# Patient Record
Sex: FEMALE | Race: BLACK OR AFRICAN AMERICAN | NOT HISPANIC OR LATINO | URBAN - METROPOLITAN AREA
[De-identification: names, ages, dates, MRNs, and addresses within clinical notes are randomized per-mention and may not be internally consistent; named-entity substitution may affect disease eponyms.]

---

## 2019-10-30 ENCOUNTER — EMERGENCY (EMERGENCY)
Facility: HOSPITAL | Age: 24
LOS: 1 days | Discharge: HOME | End: 2019-10-30
Attending: EMERGENCY MEDICINE | Admitting: EMERGENCY MEDICINE
Payer: COMMERCIAL

## 2019-10-30 VITALS
TEMPERATURE: 98 F | DIASTOLIC BLOOD PRESSURE: 67 MMHG | SYSTOLIC BLOOD PRESSURE: 102 MMHG | HEART RATE: 68 BPM | OXYGEN SATURATION: 99 % | RESPIRATION RATE: 18 BRPM

## 2019-10-30 VITALS
TEMPERATURE: 98 F | DIASTOLIC BLOOD PRESSURE: 64 MMHG | RESPIRATION RATE: 17 BRPM | HEART RATE: 95 BPM | OXYGEN SATURATION: 99 % | SYSTOLIC BLOOD PRESSURE: 103 MMHG

## 2019-10-30 LAB
ALBUMIN SERPL ELPH-MCNC: 4.5 G/DL — SIGNIFICANT CHANGE UP (ref 3.3–5)
ALP SERPL-CCNC: 59 U/L — SIGNIFICANT CHANGE UP (ref 40–120)
ALT FLD-CCNC: 9 U/L — LOW (ref 10–45)
ANION GAP SERPL CALC-SCNC: 10 MMOL/L — SIGNIFICANT CHANGE UP (ref 5–17)
AST SERPL-CCNC: 24 U/L — SIGNIFICANT CHANGE UP (ref 10–40)
BASOPHILS # BLD AUTO: 0.02 K/UL — SIGNIFICANT CHANGE UP (ref 0–0.2)
BASOPHILS NFR BLD AUTO: 0.1 % — SIGNIFICANT CHANGE UP (ref 0–2)
BILIRUB SERPL-MCNC: 0.8 MG/DL — SIGNIFICANT CHANGE UP (ref 0.2–1.2)
BUN SERPL-MCNC: 10 MG/DL — SIGNIFICANT CHANGE UP (ref 7–23)
CALCIUM SERPL-MCNC: 9.1 MG/DL — SIGNIFICANT CHANGE UP (ref 8.4–10.5)
CHLORIDE SERPL-SCNC: 103 MMOL/L — SIGNIFICANT CHANGE UP (ref 96–108)
CO2 SERPL-SCNC: 24 MMOL/L — SIGNIFICANT CHANGE UP (ref 22–31)
CREAT SERPL-MCNC: 0.82 MG/DL — SIGNIFICANT CHANGE UP (ref 0.5–1.3)
EOSINOPHIL # BLD AUTO: 0.01 K/UL — SIGNIFICANT CHANGE UP (ref 0–0.5)
EOSINOPHIL NFR BLD AUTO: 0.1 % — SIGNIFICANT CHANGE UP (ref 0–6)
GLUCOSE SERPL-MCNC: 95 MG/DL — SIGNIFICANT CHANGE UP (ref 70–99)
HCG SERPL-ACNC: <0 MIU/ML — SIGNIFICANT CHANGE UP
HCT VFR BLD CALC: 41.5 % — SIGNIFICANT CHANGE UP (ref 34.5–45)
HGB BLD-MCNC: 12 G/DL — SIGNIFICANT CHANGE UP (ref 11.5–15.5)
IMM GRANULOCYTES NFR BLD AUTO: 0.7 % — SIGNIFICANT CHANGE UP (ref 0–1.5)
LIDOCAIN IGE QN: 24 U/L — SIGNIFICANT CHANGE UP (ref 7–60)
LYMPHOCYTES # BLD AUTO: 0.99 K/UL — LOW (ref 1–3.3)
LYMPHOCYTES # BLD AUTO: 6.5 % — LOW (ref 13–44)
MCHC RBC-ENTMCNC: 28 PG — SIGNIFICANT CHANGE UP (ref 27–34)
MCHC RBC-ENTMCNC: 28.9 GM/DL — LOW (ref 32–36)
MCV RBC AUTO: 97 FL — SIGNIFICANT CHANGE UP (ref 80–100)
MONOCYTES # BLD AUTO: 0.88 K/UL — SIGNIFICANT CHANGE UP (ref 0–0.9)
MONOCYTES NFR BLD AUTO: 5.7 % — SIGNIFICANT CHANGE UP (ref 2–14)
NEUTROPHILS # BLD AUTO: 13.33 K/UL — HIGH (ref 1.8–7.4)
NEUTROPHILS NFR BLD AUTO: 86.9 % — HIGH (ref 43–77)
NRBC # BLD: 0 /100 WBCS — SIGNIFICANT CHANGE UP (ref 0–0)
PLATELET # BLD AUTO: 174 K/UL — SIGNIFICANT CHANGE UP (ref 150–400)
POTASSIUM SERPL-MCNC: 3.5 MMOL/L — SIGNIFICANT CHANGE UP (ref 3.5–5.3)
POTASSIUM SERPL-SCNC: 3.5 MMOL/L — SIGNIFICANT CHANGE UP (ref 3.5–5.3)
PROT SERPL-MCNC: 7.9 G/DL — SIGNIFICANT CHANGE UP (ref 6–8.3)
RBC # BLD: 4.28 M/UL — SIGNIFICANT CHANGE UP (ref 3.8–5.2)
RBC # FLD: 13.4 % — SIGNIFICANT CHANGE UP (ref 10.3–14.5)
SODIUM SERPL-SCNC: 137 MMOL/L — SIGNIFICANT CHANGE UP (ref 135–145)
WBC # BLD: 15.33 K/UL — HIGH (ref 3.8–10.5)
WBC # FLD AUTO: 15.33 K/UL — HIGH (ref 3.8–10.5)

## 2019-10-30 PROCEDURE — 96375 TX/PRO/DX INJ NEW DRUG ADDON: CPT

## 2019-10-30 PROCEDURE — 36415 COLL VENOUS BLD VENIPUNCTURE: CPT

## 2019-10-30 PROCEDURE — 85025 COMPLETE CBC W/AUTO DIFF WBC: CPT

## 2019-10-30 PROCEDURE — 80053 COMPREHEN METABOLIC PANEL: CPT

## 2019-10-30 PROCEDURE — 84702 CHORIONIC GONADOTROPIN TEST: CPT

## 2019-10-30 PROCEDURE — 83690 ASSAY OF LIPASE: CPT

## 2019-10-30 PROCEDURE — 99284 EMERGENCY DEPT VISIT MOD MDM: CPT

## 2019-10-30 PROCEDURE — 99284 EMERGENCY DEPT VISIT MOD MDM: CPT | Mod: 25

## 2019-10-30 PROCEDURE — 96374 THER/PROPH/DIAG INJ IV PUSH: CPT

## 2019-10-30 RX ORDER — ACETAMINOPHEN 500 MG
650 TABLET ORAL ONCE
Refills: 0 | Status: COMPLETED | OUTPATIENT
Start: 2019-10-30 | End: 2019-10-30

## 2019-10-30 RX ORDER — ONDANSETRON 8 MG/1
4 TABLET, FILM COATED ORAL ONCE
Refills: 0 | Status: COMPLETED | OUTPATIENT
Start: 2019-10-30 | End: 2019-10-30

## 2019-10-30 RX ORDER — FAMOTIDINE 10 MG/ML
20 INJECTION INTRAVENOUS ONCE
Refills: 0 | Status: COMPLETED | OUTPATIENT
Start: 2019-10-30 | End: 2019-10-30

## 2019-10-30 RX ORDER — SODIUM CHLORIDE 9 MG/ML
1000 INJECTION INTRAMUSCULAR; INTRAVENOUS; SUBCUTANEOUS ONCE
Refills: 0 | Status: COMPLETED | OUTPATIENT
Start: 2019-10-30 | End: 2019-10-30

## 2019-10-30 RX ADMIN — Medication 650 MILLIGRAM(S): at 19:47

## 2019-10-30 RX ADMIN — FAMOTIDINE 20 MILLIGRAM(S): 10 INJECTION INTRAVENOUS at 17:19

## 2019-10-30 RX ADMIN — SODIUM CHLORIDE 1000 MILLILITER(S): 9 INJECTION INTRAMUSCULAR; INTRAVENOUS; SUBCUTANEOUS at 17:18

## 2019-10-30 RX ADMIN — ONDANSETRON 4 MILLIGRAM(S): 8 TABLET, FILM COATED ORAL at 17:19

## 2019-10-30 NOTE — ED PROVIDER NOTE - CLINICAL SUMMARY MEDICAL DECISION MAKING FREE TEXT BOX
Pt p/w n/v/d, abd pain, s/p eating pork. Pt a/w b/l ext tingling and carpal spasm, likely 2/2 hyperventilation. IVF, supportive care, check labs, re-eval. Consider CT. Dispo pending w/u and clinical status. DDx includes viral illness / AGE, food poisoning, less likely other intra-abdominal pathology.

## 2019-10-30 NOTE — ED PROVIDER NOTE - CARE PLAN
Principal Discharge DX:	Abdominal pain, unspecified abdominal location  Secondary Diagnosis:	Vomiting and diarrhea

## 2019-10-30 NOTE — ED PROVIDER NOTE - OBJECTIVE STATEMENT
Pt w/ PMHx asthma, no PSHx p/w abd pain, n/v. Pt ate lunch at a restaurant, pork w/ vegetables. 10 min later pt began feeling periumbilical, sharp, constant, abd pain, w/ n/v x 4, yellow in color. + single BM, watery to soft, yellow in color. No blood noted. No fever. + chills. Pt reports feeling tingling in her legs and arms b/l. LMP 10/20. No vag bleeding or discharge. Pt does not suspect pregnancy

## 2019-10-30 NOTE — ED PROVIDER NOTE - PROGRESS NOTE DETAILS
Pt feeling much better. No pain. No vomiting in the ED. Sx resolved w/ IVF, zofran / pepcid. Repeat abd exam soft, NT, ND. Pt c/o HA only at this time, Tylenol given. Pt given PO challenge and tolerated water and crackers w/o return of pain. Advised pt bland diet, oral hydration, return precautions. Will d/c

## 2019-10-30 NOTE — ED PROVIDER NOTE - NSFOLLOWUPINSTRUCTIONS_ED_ALL_ED_FT
You were evaluated in the ED for abdominal pain, vomiting and diarrhea. You received IV fluids and supportive care with resolution of symptoms. You also presented with tingling in your extremities and spasming of the hands, which results with hyperventilation, and resolved with deep breathing. Blow into a brown paper bag if this occurs at home. Your blood work showed an elevation of your white blood cells, which is consistent with the illness you had, likely a viral illness, rather than food poisoning. Eat a bland diet at home: plain bread, rice, tea, toast; avoid fatty / greasy foods. Drink plenty of water to keep yourself hydrated. If you have return of pain, pain that is localized to the right lower quadrant of your abdomen, recurrent vomiting, fever, or other concerning symptoms, please return to the ED.     Abdominal Pain    Many things can cause abdominal pain. Many times, abdominal pain is not caused by a disease and will improve without treatment. Your health care provider will do a physical exam to determine if there is a dangerous cause of your pain; blood tests and imaging may help determine the cause of your pain. However, in many cases, no cause may be found and you may need further testing as an outpatient. Monitor your abdominal pain for any changes.     SEEK IMMEDIATE MEDICAL CARE IF YOU HAVE ANY OF THE FOLLOWING SYMPTOMS: worsening abdominal pain, uncontrollable vomiting, profuse diarrhea, inability to have bowel movements or pass gas, black or bloody stools, fever accompanying chest pain or back pain, or fainting. These symptoms may represent a serious problem that is an emergency. Do not wait to see if the symptoms will go away. Get medical help right away. Call 911 and do not drive yourself to the hospital.     Viral Gastroenteritis, Adult  Image   Viral gastroenteritis is also known as the stomach flu. This condition is caused by various viruses. These viruses can be passed from person to person very easily (are very contagious). This condition may affect your stomach, small intestine, and large intestine. It can cause sudden watery diarrhea, fever, and vomiting.  Diarrhea and vomiting can make you feel weak and cause you to become dehydrated. You may not be able to keep fluids down. Dehydration can make you tired and thirsty, cause you to have a dry mouth, and decrease how often you urinate. Older adults and people with other diseases or a weak immune system are at higher risk for dehydration.  It is important to replace the fluids that you lose from diarrhea and vomiting. If you become severely dehydrated, you may need to get fluids through an IV tube.  What are the causes?  Gastroenteritis is caused by various viruses, including rotavirus and norovirus. Norovirus is the most common cause in adults.  You can get sick by eating food, drinking water, or touching a surface contaminated with one of these viruses. You can also get sick from sharing utensils or other personal items with an infected person.  What increases the risk?  This condition is more likely to develop in people:  Who have a weak defense system (immune system).Who live with one or more children who are younger than 2 years old.Who live in a nursing home.Who go on cruise ships.What are the signs or symptoms?  Symptoms of this condition start suddenly 1–2 days after exposure to a virus. Symptoms may last a few days or as long as a week. The most common symptoms are watery diarrhea and vomiting. Other symptoms include:  Fever.Headache.Fatigue.Pain in the abdomen.Chills.Weakness.Nausea.Muscle aches.Loss of appetite.How is this diagnosed?  This condition is diagnosed with a medical history and physical exam. You may also have a stool test to check for viruses or other infections.  How is this treated?  This condition typically goes away on its own. The focus of treatment is to restore lost fluids (rehydration). Your health care provider may recommend that you take an oral rehydration solution (ORS) to replace important salts and minerals (electrolytes) in your body. Severe cases of this condition may require giving fluids through an IV tube.  Treatment may also include medicine to help with your symptoms.  Follow these instructions at home:  Image   Follow instructions from your health care provider about how to care for yourself at home.  Follow these recommendations as told by your health care provider:  Take an ORS. This is a drink that is sold at pharmacies and retail stores.Drink clear fluids in small amounts as you are able. Clear fluids include water, ice chips, diluted fruit juice, and low-calorie sports drinks.Eat bland, easy-to-digest foods in small amounts as you are able. These foods include bananas, applesauce, rice, lean meats, toast, and crackers.Avoid fluids that contain a lot of sugar or caffeine, such as energy drinks, sports drinks, and soda.Avoid alcohol.Avoid spicy or fatty foods.General instructions    Drink enough fluid to keep your urine clear or pale yellow.Wash your hands often. If soap and water are not available, use hand .Make sure that all people in your household wash their hands well and often.Take over-the-counter and prescription medicines only as told by your health care provider.Rest at home while you recover.Watch your condition for any changes.Take a warm bath to relieve any burning or pain from frequent diarrhea episodes.Keep all follow-up visits as told by your health care provider. This is important.Contact a health care provider if:  You cannot keep fluids down.Your symptoms get worse.You have new symptoms.You feel light-headed or dizzy.You have muscle cramps.Get help right away if:  You have chest pain.You feel extremely weak or you faint.You see blood in your vomit.Your vomit looks like coffee grounds.You have bloody or black stools or stools that look like tar.You have a severe headache, a stiff neck, or both.You have a rash.You have severe pain, cramping, or bloating in your abdomen.You have trouble breathing or you are breathing very quickly.Your heart is beating very quickly.Your skin feels cold and clammy.You feel confused.You have pain when you urinate.You have signs of dehydration, such as:  Dark urine, very little urine, or no urine.Cracked lips.Dry mouth.Sunken eyes.Sleepiness.Weakness.This information is not intended to replace advice given to you by your health care provider. Make sure you discuss any questions you have with your health care provider.    [place] Diet  A bland diet consists of foods that are often soft and do not have a lot of fat, fiber, or extra seasonings. Foods without fat, fiber, or seasoning are easier for the body to digest. They are also less likely to irritate your mouth, throat, stomach, and other parts of your digestive system. A bland diet is sometimes called a BRAT diet.  What is my plan?  Your health care provider or food and nutrition specialist (dietitian) may recommend specific changes to your diet to prevent symptoms or to treat your symptoms. These changes may include:  Eating small meals often.Cooking food until it is soft enough to chew easily.Chewing your food well.Drinking fluids slowly.Not eating foods that are very spicy, sour, or fatty.Not eating citrus fruits, such as oranges and grapefruit.What do I need to know about this diet?  Eat a variety of foods from the bland diet food list.Do not follow a bland diet longer than needed.Ask your health care provider whether you should take vitamins or supplements.What foods can I eat?  Grains     Image   Hot cereals, such as cream of wheat. Rice. Bread, crackers, or tortillas made from refined white flour.  Vegetables     Canned or cooked vegetables. Mashed or boiled potatoes.  Fruits     Image   Bananas. Applesauce. Other types of cooked or canned fruit with the skin and seeds removed, such as canned peaches or pears.  Meats and other proteins     Image   Scrambled eggs. Creamy peanut butter or other nut butters. Lean, well-cooked meats, such as chicken or fish. Tofu. Soups or broths.  Dairy     Low-fat dairy products, such as milk, cottage cheese, or yogurt.  Beverages     Image   Water. Herbal tea. Apple juice.  Fats and oils     Mild salad dressings. [place] or olive oil.  Sweets and desserts     Pudding. Custard. Fruit gelatin. Ice cream.  The items listed above may not be a complete list of recommended foods and beverages. Contact a dietitian for more options.   What foods are not recommended?  Grains     Whole grain breads and cereals.  Vegetables     Raw vegetables.  Fruits     Raw fruits, especially citrus, berries, or dried fruits.  Dairy     Whole fat dairy foods.  Beverages     Caffeinated drinks. Alcohol.  Seasonings and condiments     Strongly flavored seasonings or condiments. Hot sauce. Salsa.  Other foods     Spicy foods. Fried foods. Sour foods, such as pickled or fermented foods. Foods with high sugar content. Foods high in fiber.  The items listed above may not be a complete list of foods and beverages to avoid. Contact a dietitian for more information.   Summary  A bland diet consists of foods that are often soft and do not have a lot of fat, fiber, or extra seasonings.Foods without fat, fiber, or seasoning are easier for the body to digest.Check with your health care provider to see how long you should follow this diet plan. It is not meant to be followed for long periods.This information is not intended to replace advice given to you by your health care provider. Make sure you discuss any questions you have with your health care provider.

## 2019-10-30 NOTE — ED PROVIDER NOTE - PATIENT PORTAL LINK FT
You can access the FollowMyHealth Patient Portal offered by [facility] by registering at the following website: [email or web address]. By joining [facility]’s FollowMyHealth portal, you will also be able to view your health information using other applications (apps) compatible with our system.

## 2019-10-30 NOTE — ED PROVIDER NOTE - NS ED ROS FT
Constitutional: No fever   Eyes: No pain, blurry vision, or discharge.  ENMT: No hearing changes, pain, discharge or infections. No neck pain or stiffness.  Cardiac: No chest pain, SOB or edema. No chest pain with exertion.  Respiratory: No cough or respiratory distress. No hemoptysis. No history of asthma or RAD.  GI: See HPI  : No dysuria, frequency or burning.  MS: No myalgia, muscle weakness, joint pain or back pain.  Neuro: No headache or weakness. No LOC.  Skin: No skin rash.   Endocrine: No history of thyroid disease or diabetes.  Except as documented in the HPI, all other systems are negative.

## 2019-10-30 NOTE — ED ADULT NURSE NOTE - OBJECTIVE STATEMENT
Pt. c/o constant "sharp" periumbilical pain, chills, N&Vx4, and non-bloody diarrhea about 30 min PTA. Sx began w/ b/l LE tingling, warmth, and blurry vision 10 min after eating pork, veg, rice from restaurant. Pt. is soft-spoken, appears lethargic.

## 2019-10-30 NOTE — ED PROVIDER NOTE - PHYSICAL EXAMINATION
Constitutional: Well appearing, well nourished, awake, alert, oriented to person, place, time/situation and in no apparent distress.  ENMT: Airway patent. Normal MM  Eyes: Clear bilaterally  Cardiac: Normal rate, regular rhythm.  Heart sounds S1, S2.  No murmurs, rubs or gallops.  Respiratory: Breaths sounds equal and clear b/l. No increased WOB, tachypnea, hypoxia, or accessory mm use. Pt speaks in full sentences.   Gastrointestinal: Abd soft, ND, NABS. + mild diffuse lower abd ttp. No guarding, rebound, or rigidity. No pulsatile abdominal masses. No organomegaly appreciated. No CVAT   Musculoskeletal: Range of motion is not limited, b/l carpal spasm  Neuro: Alert and oriented x 3, face symmetric and speech fluent. Strength 5/5 x 4 ext and symmetric, nml gross motor movement, nml gait. No focal deficits noted.  Skin: Skin normal color for race, warm, dry and intact. No evidence of rash.  Psych: Alert and oriented to person, place, time/situation. flat affect. no apparent risk to self or others.

## 2019-11-04 DIAGNOSIS — R19.7 DIARRHEA, UNSPECIFIED: ICD-10-CM

## 2019-11-04 DIAGNOSIS — R10.33 PERIUMBILICAL PAIN: ICD-10-CM

## 2019-11-04 DIAGNOSIS — R11.2 NAUSEA WITH VOMITING, UNSPECIFIED: ICD-10-CM

## 2019-11-04 DIAGNOSIS — R20.2 PARESTHESIA OF SKIN: ICD-10-CM

## 2019-11-04 DIAGNOSIS — R10.9 UNSPECIFIED ABDOMINAL PAIN: ICD-10-CM
